# Patient Record
Sex: MALE | Race: BLACK OR AFRICAN AMERICAN
[De-identification: names, ages, dates, MRNs, and addresses within clinical notes are randomized per-mention and may not be internally consistent; named-entity substitution may affect disease eponyms.]

---

## 2020-03-19 ENCOUNTER — HOSPITAL ENCOUNTER (OUTPATIENT)
Dept: HOSPITAL 62 - RAD | Age: 60
End: 2020-03-19
Attending: INTERNAL MEDICINE
Payer: COMMERCIAL

## 2020-03-19 DIAGNOSIS — M25.552: Primary | ICD-10-CM

## 2025-04-22 NOTE — RADIOLOGY REPORT (SQ)
EXAM DESCRIPTION:  HIP LEFT AP/LATERAL



COMPLETED DATE/TIME:  3/19/2020 4:04 pm



REASON FOR STUDY:  M25.552 PAIN IN LEFT HIP M25.552  PAIN IN LEFT HIP



COMPARISON:  None.



NUMBER OF VIEWS:  Two views.



TECHNIQUE:  AP pelvis and additional frog-leg view of the left hip.



LIMITATIONS:  None.



FINDINGS:  Straightening of the femoral head neck contour bilaterally consistent with impingement.  A
dvanced osteoarthritis in the right hip with bone on bone contact and remodeling of the femoral head.
  Much less severe osteoarthritis left hip.  Mild sclerosis left SI joint.  Lower lumbar spondylosis.




IMPRESSION:  Osteoarthritis.  Spondylosis and left SI joint arthropathy.



TECHNICAL DOCUMENTATION:  JOB ID:  [id number]

 2011 [employer]- All Rights Reserved



Reading location - IP/workstation name: [email or web address] [Mother] : mother